# Patient Record
(demographics unavailable — no encounter records)

---

## 2025-03-20 NOTE — HISTORY OF PRESENT ILLNESS
[FreeTextEntry1] :   56 y/o with pmhx pf atopic dermatitis, iron def anemia (s/p iron infusion with heme) presenting for follow up for Rheumatoid arthritis (started on Arava since 12/12/2023, Humira since 8/2024)    Established care in 11/2023   She started having hand pain in April 2023 worsening since. Described as achy sharp pain with swelling in her hands/wrist, thenar area. Episodes of pain around 3 times a month, lasting 1 to 2 days and then goes away. Using the hands makes it worse. ACE bandaid, Motrin makes it better. Sometimes tingling in her hands with these flares preceded by swelling. Intermittent shoulder pain, not often. Reports morning stiffness when she has flares, resolving by noon.   Serological workup showing positive RF, positive CCP, as well as elevated inflammatory markers  She also has been noted to have anemia for which she follows with hematology and has received iron infusions. Possibly component of anemia of chronic disease as well.  She takes prednisone taper as needed for flares  Patient started leflunomide in December 2023  She initially felt 75% better on LEF, but was still getting episodes of joint pain and swelling requiring prednisone (right knee pain,right hand pain )  She was having episode of swelling int he right knee with stiffness and b/l wrist swelling/warmth/stiffness  Then started Humira in 8/2024   Patient very satisfied with Humira.-  No side effects of Humita or Lef Reports has been slightly stressed lately and is going to be changing her job soon. . Has noticed bp elevated when she gets to work. Today bp normal  Last seen 12/2024,   Labs  10/2024  ESR 34 CRP <3  Hg 9.0 low iron ferritin  CMC stable    3/4/24  ESR 42 CRP 12  11/2023  RF 80  ESR 63 CRP 15  Normal 14.3.3 ETA protein  Creatinine 0.59  LFTs normal  Hg 6.9-->11  Negative/normal hepatitis, QuantiFERON      3/2023  ESR 42 CRP 12  CBC Hg 11.2 normal cmp  6/15/2024 (blood work done by her PCP)  Negative LUC  CCP greater than 250   RF 71 ESR 36  CBC hemoglobin 10.1 WBC 9.06 platelet 369  normal CMP      Hand/wrist x-ray  IMPRESSION:  Unremarkable bilateral hands/wrist  Hand/wrist u/s  IMPRESSION: No acute inflammatory change

## 2025-03-20 NOTE — ASSESSMENT
[FreeTextEntry1] :   54 y/o with pmhx pf atopic dermatitis, iron def anemia/anemia of chronic disease (s/p iron infusion with heme) presenting for follow-up of seropositive rheumatoid arthritis (RF 80, CCP > 250)    She started having hand pain in April 2023 Described as achy sharp pain, hands/wrist swelling Reports morning stiffness Serological workup is showing positive rheumatoid factor, CCP, elevated inflammatory markers   Also has anemia for which she is seen hematology.  Hand x-ray and wrist x-ray normal and u/s normal    -Patient did not want to start methotrexate due to concerns for side effects/hair loss  -On leflunomide 20mg daily since 12/12/23  -Side effects of LEF were discussed with the patient in detail including but not limited to GI upset, HTN, hepatotoxicity, and cytopenias. She initially felt 75% better on LEF, but was still getting flares in her hands and wrist  -This therapy requires monitoring for toxicity. Will evaluate with frequent monitoring CBC /LFTs.  -Counseled to avoid pregnancy while on LEF. Advised on risk of infection and educated on precautions/hygiene  -c/w Humira due to controlled symptoms. Started 8/2024 Side effects of TNF inhibitor Humira were discussed with the pt in detail including increased risk of infection, demyelinating disease, re-activation of latent TB, possible increased risk of blood/skin tumors. If she has an infection and advised to stop the medication until infection resolves. Hepatitis panel negative/TB negative 11/20/23  Provided her with information on Humira in the past (patient information sheet) script for pen injector given hand pain and difficulty to maneuver syringe.  ---Prednisone taper as needed for joint flare ; has not required any - Tolerating Humira and lef well - Has been having high bp episodes likely related to stress factors. Patient advised to monitor with her PCP. She will let me know if bp is not controlled. Can always consider Lef reduce dosed but I explained to patient high HTN is multifactorial and likely not due to Lef. Patient wants to continue medication right now - Advised patient to follow up with heme for anemia, iron deficiency -Labs now (also ordering TB/Hep given Biologic)  Follow up 3 Months (pt would like to do VTEB)   Total time spent in review of patient history, clinical exam, management, counseling, and plan of care:  32min

## 2025-03-20 NOTE — PHYSICAL EXAM
[TextEntry] : GENERAL: Appears in no acute distress HEENT: EOMI. No conjunctival erythema. Moist mucous membranes. No nasopharyngeal ulcers NECK: Supple, no cervical lymphadenopathy CARDIOVASCULAR: RRR. S1, S2 auscultated. PULMONARY: Clear to auscultation b/l, no wheezes, rales, or crackles MSK: No active synovitis, swelling, erythema, or warmth. No joint tenderness to palpation. No Bouchards or Heberdens nodes. No deformities. knee crepitus. Normal ROM of , b/l upper and lower extremities. SKIN: No lesions or rashes NEURO: No focal deficits.  PSYCH:Normal affect and thought process.

## 2025-06-09 NOTE — HISTORY OF PRESENT ILLNESS
[FreeTextEntry1] : 56 y/o with pmhx pf atopic dermatitis, iron def anemia (s/p iron infusion with heme) presenting for follow up for Seropositive Rheumatoid arthritis RF 80  (started on Arava since 12/12/2023, Humira since 8/2024)   Established care in 11/2023   She started having hand pain in April 2023  Described as achy sharp pain with swelling in her hands/wrist, thenar area. Episodes of pain around 3 times a month, lasting 1 to 2 days and then goes away. Sometimes tingling in her hands with these flares preceded by swelling. Intermittent shoulder pain, not often. Reports morning stiffness when she has flares, resolving by noon.   Serological workup showing positive RF, positive CCP, as well as elevated inflammatory markers  She also has been noted to have anemia for which she follows with hematology and has received iron infusions. Possibly component of anemia of chronic disease as well.  She takes prednisone taper as needed for flares  Patient started leflunomide in December 2023  She initially felt 75% better on LEF, but was still getting episodes of joint pain and swelling requiring prednisone (right knee pain,right hand pain )  She was having episode of swelling int he right knee with stiffness and b/l wrist swelling/warmth/stiffness  Then started Humira in 8/2024  Patient very satisfied with Humira.-  No side effects of Humira or Lef   Since last visit, -doing well, reports no acute issues - tolerating meds well, denies side effects - once in while, she has left shoulder achiness which improves with motrin, this is not often - does not cause severe pain, swelling, or ROM issues    Labs   3 /2025 ESR 34 CRP <3 NEG hep, quantiferon  Hg 14.3   10/2024   ESR 34 CRP <3  Hg 9.0 low iron ferritin   CMC stable         3/4/24   ESR 42 CRP 12   11/2023   RF 80  ESR 63 CRP 15   Normal 14.3.3 ETA protein   Creatinine 0.59  LFTs normal   Hg 6.9-->11   Negative/normal hepatitis, QuantiFERON            3/2023      ESR 42 CRP 12   CBC Hg 11.2 normal cmp   6/15/2024 (blood work done by her PCP)   Negative LUC   CCP greater than 250      RF 71   ESR 36   CBC hemoglobin 10.1 WBC 9.06 platelet 369   normal CMP            Hand/wrist x-ray   IMPRESSION:   Unremarkable bilateral hands/wrist   Hand/wrist u/s      IMPRESSION: No acute inflammatory change

## 2025-06-09 NOTE — ASSESSMENT
[FreeTextEntry1] : 54 y/o with pmhx pf atopic dermatitis, iron def anemia/anemia of chronic disease (s/p iron infusion with heme) presenting for follow-up of seropositive rheumatoid arthritis (RF 80, CCP > 250)     She started having hand pain in April 2023 Described as achy sharp pain, hands/wrist swelling Reports morning stiffness Serological workup is showing positive rheumatoid factor, CCP, elevated inflammatory markers  Also has anemia for which she has seen hematology.  Hand x-ray and wrist x-ray normal and u/s normal   -Patient did not want to start methotrexate due to concerns for side effects/hair loss  -On leflunomide 20mg daily since 12/12/23   -Side effects of LEF were discussed with the patient in detail including but not limited to GI upset, HTN, hepatotoxicity, and cytopenias. She initially felt 75% better on LEF, but was still getting flares in her hands and wrist  -This therapy requires monitoring for toxicity. Will evaluate with frequent monitoring CBC /LFTs.  -Counseled to avoid pregnancy while on LEF. Advised on risk of infection and educated on precautions/hygiene  -c/w Humira due to controlled symptoms. Started 8/2024 Side effects of TNF inhibitor Humira were discussed with the pt in detail including increased risk of infection, demyelinating disease, re-activation of latent TB, possible increased risk of blood/skin tumors. If she has an infection and advised to stop the medication until infection resolves. Hepatitis panel negative/TB negative 3/2025 Provided her with information on Humira in the past (patient information sheet) script for pen injector given hand pain and difficulty to maneuver syringe.  -Regarding left shoulder pain, advised conservative measures with heat, shoulder exercises, and NSAID/tylenol as needed. If pain more frequent or worsening, she will follow up in person to evaluate. Patient agrees with this approach ---Prednisone taper as needed for joint flare ; has not required any  - Tolerating Humira and Lef well  - Advised patient to follow up with heme for anemia, iron deficiency. Last Hg normal  -Labs now  Follow up 3 Months  (patient prefers tele, if any concerns , advised her to change from tele to in person)   Total time spent in review of patient history, clinical exam, management, counseling, and plan of care: 30 min

## 2025-06-09 NOTE — PHYSICAL EXAM
[TextEntry] : GENERAL: Appears in no acute distress HEENT: EOMI. No conjunctival erythema. Moist mucous membranes. No nasopharyngeal ulcers MSK: No active synovitis, swelling, erythema, or warmth. No Bouchards or Heberdens nodes. No deformities. Normal ROM of , b/l upper and lower extremities. SKIN: No lesions or rashes NEURO: No focal deficits. PSYCH:Normal affect and thought process.

## 2025-06-09 NOTE — REASON FOR VISIT
[Follow-Up: _____] : a [unfilled] follow-up visit [Home] : at home, [unfilled] , at the time of the visit. [Medical Office: ([facility])___] : at the medical office located in  [Telehealth (audio & video)] : This visit was provided via telehealth using real-time 2-way audio visual technology. [Verbal consent obtained from patient] : the patient, [unfilled]